# Patient Record
Sex: FEMALE | URBAN - METROPOLITAN AREA
[De-identification: names, ages, dates, MRNs, and addresses within clinical notes are randomized per-mention and may not be internally consistent; named-entity substitution may affect disease eponyms.]

---
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
## 2025-06-10 NOTE — PROCEDURE: PATIENT SPECIFIC COUNSELING
Addended by: CARLIE PURI on: 6/10/2025 08:31 AM     Modules accepted: Orders    
Detail Level: Zone
Maribel is tolerating medication. Discontinued for a month(September) due to new piercing and tattoo and started the last month (October). Discussed using tretinoin after finishing isotret and follow up in 6 months